# Patient Record
Sex: MALE | Race: WHITE | URBAN - METROPOLITAN AREA
[De-identification: names, ages, dates, MRNs, and addresses within clinical notes are randomized per-mention and may not be internally consistent; named-entity substitution may affect disease eponyms.]

---

## 2022-12-07 ENCOUNTER — APPOINTMENT (RX ONLY)
Dept: URBAN - METROPOLITAN AREA CLINIC 138 | Facility: CLINIC | Age: 62
Setting detail: DERMATOLOGY
End: 2022-12-07

## 2022-12-07 DIAGNOSIS — L91.8 OTHER HYPERTROPHIC DISORDERS OF THE SKIN: ICD-10-CM

## 2022-12-07 PROCEDURE — ? BENIGN DESTRUCTION COSMETIC

## 2022-12-07 ASSESSMENT — LOCATION SIMPLE DESCRIPTION DERM
LOCATION SIMPLE: SCROTUM
LOCATION SIMPLE: LEFT THIGH
LOCATION SIMPLE: GROIN
LOCATION SIMPLE: RIGHT THIGH

## 2022-12-07 ASSESSMENT — LOCATION DETAILED DESCRIPTION DERM
LOCATION DETAILED: RIGHT ANTERIOR SCROTUM
LOCATION DETAILED: RIGHT ANTERIOR PROXIMAL THIGH
LOCATION DETAILED: LEFT INGUINAL CREASE
LOCATION DETAILED: SUPRAPUBIC SKIN
LOCATION DETAILED: LEFT ANTERIOR PROXIMAL THIGH
LOCATION DETAILED: RIGHT INGUINAL CREASE

## 2022-12-07 ASSESSMENT — LOCATION ZONE DERM
LOCATION ZONE: LEG
LOCATION ZONE: GENITALIA
LOCATION ZONE: TRUNK

## 2022-12-07 NOTE — PROCEDURE: BENIGN DESTRUCTION COSMETIC
Consent: The patient's consent was obtained including but not limited to risks of crusting, scabbing, blistering, scarring, darker or lighter pigmentary change, recurrence, incomplete removal and infection.
Price (Use Numbers Only, No Special Characters Or $): 163
Detail Level: Detailed
Anesthesia Volume In Cc: 0.5
Post-Care Instructions: I reviewed with the patient in detail post-care instructions. Patient is to wear sunprotection, and avoid picking at any of the treated lesions. Pt may apply Vaseline to crusted or scabbing areas.

## 2023-02-28 ENCOUNTER — APPOINTMENT (RX ONLY)
Dept: URBAN - METROPOLITAN AREA CLINIC 138 | Facility: CLINIC | Age: 63
Setting detail: DERMATOLOGY
End: 2023-02-28

## 2023-02-28 DIAGNOSIS — L91.8 OTHER HYPERTROPHIC DISORDERS OF THE SKIN: ICD-10-CM

## 2023-02-28 PROCEDURE — ? BENIGN DESTRUCTION COSMETIC

## 2023-02-28 ASSESSMENT — LOCATION SIMPLE DESCRIPTION DERM
LOCATION SIMPLE: RIGHT THIGH
LOCATION SIMPLE: RIGHT SCROTUM
LOCATION SIMPLE: LEFT SCROTUM
LOCATION SIMPLE: LEFT THIGH

## 2023-02-28 ASSESSMENT — LOCATION DETAILED DESCRIPTION DERM
LOCATION DETAILED: RIGHT MEDIAL POSTERIOR THIGH
LOCATION DETAILED: LEFT SUPERIOR LATERAL SCROTUM
LOCATION DETAILED: LEFT MEDIAL POSTERIOR THIGH
LOCATION DETAILED: RIGHT INFERIOR LATERAL SCROTUM

## 2023-02-28 ASSESSMENT — LOCATION ZONE DERM
LOCATION ZONE: SCROTUM
LOCATION ZONE: LEG

## 2023-02-28 NOTE — PROCEDURE: BENIGN DESTRUCTION COSMETIC
Detail Level: Detailed
Price (Use Numbers Only, No Special Characters Or $): 100.00
Post-Care Instructions: I reviewed with the patient in detail post-care instructions. Patient is to wear sunprotection, and avoid picking at any of the treated lesions. Pt may apply Vaseline to crusted or scabbing areas.
Anesthesia Volume In Cc: 0.5
Consent: The patient's consent was obtained including but not limited to risks of crusting, scabbing, blistering, scarring, darker or lighter pigmentary change, recurrence, incomplete removal and infection.

## 2025-04-03 ENCOUNTER — APPOINTMENT (OUTPATIENT)
Dept: URBAN - METROPOLITAN AREA CLINIC 138 | Facility: CLINIC | Age: 65
Setting detail: DERMATOLOGY
End: 2025-04-03

## 2025-04-03 DIAGNOSIS — L57.0 ACTINIC KERATOSIS: ICD-10-CM

## 2025-04-03 DIAGNOSIS — L91.8 OTHER HYPERTROPHIC DISORDERS OF THE SKIN: ICD-10-CM

## 2025-04-03 PROCEDURE — ? BENIGN DESTRUCTION COSMETIC

## 2025-04-03 PROCEDURE — ? LIQUID NITROGEN

## 2025-04-03 PROCEDURE — 17004 DESTROY PREMAL LESIONS 15/>: CPT

## 2025-04-03 PROCEDURE — ? COUNSELING

## 2025-04-03 ASSESSMENT — LOCATION ZONE DERM
LOCATION ZONE: AXILLAE
LOCATION ZONE: HAND
LOCATION ZONE: ARM

## 2025-04-03 ASSESSMENT — LOCATION SIMPLE DESCRIPTION DERM
LOCATION SIMPLE: RIGHT HAND
LOCATION SIMPLE: RIGHT FOREARM
LOCATION SIMPLE: RIGHT POSTERIOR AXILLA
LOCATION SIMPLE: LEFT AXILLARY VAULT
LOCATION SIMPLE: LEFT WRIST
LOCATION SIMPLE: RIGHT AXILLARY VAULT
LOCATION SIMPLE: LEFT UPPER ARM
LOCATION SIMPLE: LEFT FOREARM

## 2025-04-03 ASSESSMENT — LOCATION DETAILED DESCRIPTION DERM
LOCATION DETAILED: LEFT AXILLARY VAULT
LOCATION DETAILED: LEFT PROXIMAL RADIAL DORSAL FOREARM
LOCATION DETAILED: RIGHT RADIAL DORSAL HAND
LOCATION DETAILED: RIGHT POSTERIOR AXILLA
LOCATION DETAILED: LEFT DISTAL POSTERIOR UPPER ARM
LOCATION DETAILED: RIGHT AXILLARY VAULT
LOCATION DETAILED: LEFT DISTAL RADIAL DORSAL FOREARM
LOCATION DETAILED: LEFT PROXIMAL DORSAL FOREARM
LOCATION DETAILED: LEFT ANTERIOR MEDIAL PROXIMAL UPPER ARM
LOCATION DETAILED: LEFT VENTRAL PROXIMAL FOREARM
LOCATION DETAILED: LEFT DISTAL DORSAL FOREARM
LOCATION DETAILED: LEFT PROXIMAL POSTERIOR UPPER ARM
LOCATION DETAILED: LEFT DORSAL WRIST
LOCATION DETAILED: RIGHT DISTAL DORSAL FOREARM
LOCATION DETAILED: RIGHT VENTRAL DISTAL FOREARM
LOCATION DETAILED: RIGHT PROXIMAL DORSAL FOREARM
LOCATION DETAILED: LEFT VENTRAL DISTAL FOREARM

## 2025-04-03 NOTE — PROCEDURE: BENIGN DESTRUCTION COSMETIC
Detail Level: Simple
Post-Care Instructions: I reviewed with the patient in detail post-care instructions. Patient is to wear sunprotection, and avoid picking at any of the treated lesions. Pt may apply Vaseline to crusted or scabbing areas.
Consent: The patient's consent was obtained including but not limited to risks of crusting, scabbing, blistering, scarring, darker or lighter pigmentary change, recurrence, incomplete removal and infection.
Price (Use Numbers Only, No Special Characters Or $): 110